# Patient Record
Sex: MALE | Race: WHITE | NOT HISPANIC OR LATINO | URBAN - METROPOLITAN AREA
[De-identification: names, ages, dates, MRNs, and addresses within clinical notes are randomized per-mention and may not be internally consistent; named-entity substitution may affect disease eponyms.]

---

## 2023-12-29 ENCOUNTER — OFFICE VISIT (OUTPATIENT)
Dept: URGENT CARE | Facility: CLINIC | Age: 43
End: 2023-12-29
Payer: COMMERCIAL

## 2023-12-29 VITALS
WEIGHT: 230 LBS | BODY MASS INDEX: 33.99 KG/M2 | HEART RATE: 80 BPM | OXYGEN SATURATION: 98 % | SYSTOLIC BLOOD PRESSURE: 135 MMHG | DIASTOLIC BLOOD PRESSURE: 87 MMHG | RESPIRATION RATE: 16 BRPM | TEMPERATURE: 97.3 F

## 2023-12-29 DIAGNOSIS — H61.20 IMPACTED CERUMEN, UNSPECIFIED LATERALITY: ICD-10-CM

## 2023-12-29 DIAGNOSIS — H60.391 OTHER INFECTIVE ACUTE OTITIS EXTERNA OF RIGHT EAR: Primary | ICD-10-CM

## 2023-12-29 PROCEDURE — 1036F TOBACCO NON-USER: CPT | Performed by: FAMILY MEDICINE

## 2023-12-29 PROCEDURE — 99202 OFFICE O/P NEW SF 15 MIN: CPT | Performed by: FAMILY MEDICINE

## 2023-12-29 RX ORDER — NEOMYCIN SULFATE, POLYMYXIN B SULFATE AND HYDROCORTISONE 10; 3.5; 1 MG/ML; MG/ML; [USP'U]/ML
3 SUSPENSION/ DROPS AURICULAR (OTIC) 4 TIMES DAILY
Qty: 10 ML | Refills: 0 | Status: SHIPPED | OUTPATIENT
Start: 2023-12-29 | End: 2024-01-08

## 2023-12-29 ASSESSMENT — PAIN SCALES - GENERAL: PAINLEVEL: 3

## 2023-12-29 NOTE — PROGRESS NOTES
44 yo  States 5 days of right ear pain and facial swelling  Uses qtips but doesn't 'put them in too far'  Right ear ear with hearing loss and ringing  No cough  No sinus pain  No rhinorrhea    Problem list  Healthy    Social- nonsmoker    Meds none    Exam  Blood pressure 135/87, pulse 80, temperature 36.3 °C (97.3 °F), resp. rate 16, weight 104 kg (230 lb), SpO2 98 %.  Looks well  Right ear canal red and swollen and pain on extension  Blocked with cerumen  Left 90 percent blocked with cerumen    After irrigation- left patent. Tm normal  Right less cerumen- canal red and swollen. Tm cannot be visualized    A/p  Otitis externa with cerumen impaction- use cortisporin drops. Keep appointment with ent on January 6. Take tylenol for pain  Avoid qtips  Melissa Julien MD

## 2025-10-31 ENCOUNTER — APPOINTMENT (OUTPATIENT)
Dept: GENETICS | Facility: CLINIC | Age: 45
End: 2025-10-31
Payer: COMMERCIAL